# Patient Record
Sex: MALE | Race: WHITE | Employment: FULL TIME | URBAN - NONMETROPOLITAN AREA
[De-identification: names, ages, dates, MRNs, and addresses within clinical notes are randomized per-mention and may not be internally consistent; named-entity substitution may affect disease eponyms.]

---

## 2021-02-10 ENCOUNTER — HOSPITAL ENCOUNTER (OUTPATIENT)
Dept: CT IMAGING | Age: 46
Discharge: HOME | End: 2021-02-10
Payer: COMMERCIAL

## 2021-02-10 DIAGNOSIS — R10.9 FLANK PAIN: ICD-10-CM

## 2021-02-10 PROCEDURE — 74176 CT ABD & PELVIS W/O CONTRAST: CPT

## 2021-12-20 ENCOUNTER — HOSPITAL ENCOUNTER (EMERGENCY)
Age: 46
Discharge: HOME | End: 2021-12-20
Payer: COMMERCIAL

## 2021-12-20 ENCOUNTER — APPOINTMENT (OUTPATIENT)
Dept: GENERAL RADIOLOGY | Age: 46
End: 2021-12-20
Payer: COMMERCIAL

## 2021-12-20 VITALS
HEART RATE: 84 BPM | HEIGHT: 75 IN | OXYGEN SATURATION: 97 % | DIASTOLIC BLOOD PRESSURE: 99 MMHG | WEIGHT: 285 LBS | TEMPERATURE: 98.2 F | BODY MASS INDEX: 35.43 KG/M2 | RESPIRATION RATE: 15 BRPM | SYSTOLIC BLOOD PRESSURE: 160 MMHG

## 2021-12-20 DIAGNOSIS — S63.654A SPRAIN OF METACARPOPHALANGEAL (MCP) JOINT OF RIGHT RING FINGER, INITIAL ENCOUNTER: Primary | ICD-10-CM

## 2021-12-20 PROCEDURE — 73130 X-RAY EXAM OF HAND: CPT

## 2021-12-20 PROCEDURE — 99282 EMERGENCY DEPT VISIT SF MDM: CPT

## 2021-12-20 ASSESSMENT — PAIN DESCRIPTION - ORIENTATION: ORIENTATION: RIGHT

## 2021-12-20 ASSESSMENT — PAIN DESCRIPTION - DESCRIPTORS: DESCRIPTORS: DISCOMFORT

## 2021-12-20 ASSESSMENT — PAIN SCALES - GENERAL: PAINLEVEL_OUTOF10: 4

## 2021-12-20 ASSESSMENT — PAIN DESCRIPTION - LOCATION: LOCATION: FINGER (COMMENT WHICH ONE)

## 2021-12-20 ASSESSMENT — PAIN DESCRIPTION - PAIN TYPE: TYPE: ACUTE PAIN

## 2021-12-20 ASSESSMENT — ENCOUNTER SYMPTOMS: COLOR CHANGE: 0

## 2021-12-20 NOTE — ED PROVIDER NOTES
[facility] Emergency [address] [place] [address]       Chief Complaint   Patient presents with    Finger Injury     right ring finger       Nurses Notes reviewed and I agree except as noted in the HPI. HISTORY OF PRESENT ILLNESS    Caroline Bence is a 48 y.o. male who presents to the ED for evaluation of finger injury. Patient notes he was using a hammer drill, it kind of got away from him and he hurt his right ring finger. He notes pain to the PIP. He notes some swelling. He denies any numbness or tingling. Denies history of injury before the past.  Denies any injury to the hand or the wrist.  He denies any open wounds        HPI was provided by the patient. REVIEW OF SYSTEMS     Review of Systems   Constitutional: Negative for activity change. Musculoskeletal: Positive for arthralgias and joint swelling. Skin: Negative for color change and wound. Neurological: Negative for weakness and numbness. Hematological: Does not bruise/bleed easily. PAST MEDICAL HISTORY   No past medical history on file. SURGICALHISTORY      has no past surgical history on file. CURRENT MEDICATIONS     There are no discharge medications for this patient. ALLERGIES     has No Known Allergies. FAMILY HISTORY     has no family status information on file. family history is not on file.     SOCIAL HISTORY       Social History     Socioeconomic History    Marital status:      Spouse name: Not on file    Number of children: Not on file    Years of education: Not on file    Highest education level: Not on file   Occupational History    Not on file   Tobacco Use    Smoking status: Not on file    Smokeless tobacco: Not on file   Substance and Sexual Activity    Alcohol use: Not on file    Drug use: Not on file    Sexual activity: Not on file   Other Topics Concern    Not on file   Social History Narrative    Not on file     Social Determinants of Health     Financial Resource Strain:     Difficulty of Paying Living Expenses: Not on file   Food Insecurity:     Worried About Running Out of Food in the Last Year: Not on file    Carlo of Food in the Last Year: Not on file   Transportation Needs:     Lack of Transportation (Medical): Not on file    Lack of Transportation (Non-Medical): Not on file   Physical Activity:     Days of Exercise per Week: Not on file    Minutes of Exercise per Session: Not on file   Stress:     Feeling of Stress : Not on file   Social Connections:     Frequency of Communication with Friends and Family: Not on file    Frequency of Social Gatherings with Friends and Family: Not on file    Attends [employer] Services: Not on file    Active Member of [address] [place] [address] [employer] [employer] or Organizations: Not on file    Attends Club or Organization Meetings: Not on file    Marital Status: Not on file   Intimate Partner Violence:     Fear of Current or Ex-Partner: Not on file    Emotionally Abused: Not on file    Physically Abused: Not on file    Sexually Abused: Not on file   Housing Stability:     Unable to Pay for Housing in the Last Year: Not on file    Number of [place] in the Last Year: Not on file    Unstable Housing in the Last Year: Not on file       PHYSICAL EXAM     INITIAL VITALS:  height is 6' 3\" (1.905 m) and weight is 285 lb (129.3 kg). His oral temperature is 98.2 °F (36.8 °C). His blood pressure is 160/99 (abnormal) and his pulse is 84. His respiration is 15 and oxygen saturation is 97%. Physical Exam  Constitutional:       Appearance: Normal appearance. HENT:      Head: Normocephalic. Cardiovascular:      Rate and Rhythm: Normal rate. Pulses: Normal pulses. Pulmonary:      Effort: Pulmonary effort is normal.   Musculoskeletal:      Right hand: Tenderness (Tenderness to ring finger right side, DIP) present. Skin:     General: Skin is warm and dry. Capillary Refill: Capillary refill takes less than 2 seconds.    Neurological:      Mental Status: He is alert. DIFFERENTIAL DIAGNOSIS:   Finger strain sprain fracture dislocation    DIAGNOSTIC RESULTS         RADIOLOGY: non-plainfilm images(s) such as CT, Ultrasound and MRI are read by the radiologist.  Plain radiographic images are visualized and preliminarily interpreted by the emergency physician unless otherwise stated below. XR HAND RIGHT (MIN 3 VIEWS)   Final Result      Small calcification at the base of the fourth proximal phalanx suspicious for avulsion fracture. Final report electronically signed by Dr. Joaquin Falcon on 12/20/2021 1:21 PM            LABS:   Labs Reviewed - No data to display    EMERGENCY DEPARTMENT COURSE:   Vitals:    Vitals:    12/20/21 1144   BP: (!) 160/99   Pulse: 84   Resp: 15   Temp: 98.2 °F (36.8 °C)   TempSrc: Oral   SpO2: 97%   Weight: 285 lb (129.3 kg)   Height: 6' 3\" (1.905 m)       MDM    Patient was seen and evaluated in the emergency department, patient appeared to be in no acute distress, vital signs reviewed, hypertension noted. Physical exam was completed, there is some swelling and tenderness of the PIP of the right ring finger. X-rays were performed, possible small calcification at the base of the fourth proximal phalanx. This does not fit the patient's examination. Discussed my findings and plan of care the patient is amenable discharge. Miky tape was applied, he is advised to ice, keep elevated, he is allowed to return to work at full capacity. He is advised to follow-up with occupational health if symptoms fail to improve. He verbalized understanding of plan of care. Medications - No data to display    Patient was seenindependently by myself. The patient's final impression and disposition and plan was determined by myself. CRITICAL CARE:   None    CONSULTS:  None    PROCEDURES:  None    FINAL IMPRESSION     1.  Sprain of metacarpophalangeal (MCP) joint of right ring finger, initial encounter          DISPOSITION/PLAN   Patient discharged in stable condition    PATIENT REFERREDTO:  [address] [place], [place] [address] [address]  [phone]  Call   For follow up and evaluation      DISCHARGE MEDICATIONS:  There are no discharge medications for this patient. (Please note that portions of this note were completed with a voice recognition program.  Efforts were made to edit the dictations but occasionally words are mis-transcribed.)      Provider:  I personally performed the services described in the documentation,reviewed and edited the documentation which was dictated to the scribe in my presence, and it accurately records my words and actions.     Farhan Sepulveda CNP 12/20/21 5:19 PM    Katya Sepulveda, APRN - CNP        [facility], APRNILSON - CNP  12/20/21 6091

## 2021-12-20 NOTE — ED TRIAGE NOTES
Patient to ED c/c right ring finger injury. Patient states he was at work when he was drilling and his finger caught. Patient states pain is 4/10 on the pain scale. Finger has mild swelling.

## 2021-12-20 NOTE — ED NOTES
According to our [employer] his job doesn't require him to have a urine drug screen or alcohol screening.       Damaris Jeans, LINAN  31/70/35 [id number]

## 2024-02-06 ENCOUNTER — APPOINTMENT (OUTPATIENT)
Dept: CT IMAGING | Age: 49
End: 2024-02-06
Payer: COMMERCIAL

## 2024-02-06 ENCOUNTER — HOSPITAL ENCOUNTER (EMERGENCY)
Age: 49
Discharge: HOME | End: 2024-02-06
Attending: EMERGENCY MEDICINE
Payer: COMMERCIAL

## 2024-02-06 VITALS
HEIGHT: 74 IN | SYSTOLIC BLOOD PRESSURE: 168 MMHG | BODY MASS INDEX: 40.43 KG/M2 | OXYGEN SATURATION: 98 % | DIASTOLIC BLOOD PRESSURE: 86 MMHG | HEART RATE: 72 BPM | WEIGHT: 315 LBS | RESPIRATION RATE: 16 BRPM

## 2024-02-06 DIAGNOSIS — S01.512A INTRAORAL LACERATION, INITIAL ENCOUNTER: ICD-10-CM

## 2024-02-06 DIAGNOSIS — S02.401B: Primary | ICD-10-CM

## 2024-02-06 LAB
ALBUMIN SERPL BCP-MCNC: 3.9 GM/DL (ref 3.4–5)
ALP SERPL-CCNC: 104 U/L (ref 46–116)
ALT SERPL W P-5'-P-CCNC: 29 U/L (ref 14–63)
ANION GAP SERPL CALC-SCNC: 9 MEQ/L (ref 8–16)
AST SERPL W P-5'-P-CCNC: 22 U/L (ref 15–37)
BASOPHILS # BLD: 0.6 % (ref 0–3)
BASOPHILS ABSOLUTE: 0 THOU/MM3 (ref 0–0.1)
BILIRUB SERPL-MCNC: 0.3 MG/DL (ref 0.2–1)
BUN SERPL-MCNC: 20 MG/DL (ref 7–18)
CALCIUM SERPL-MCNC: 8.6 MG/DL (ref 8.5–10.1)
CHLORIDE SERPL-SCNC: 105 MEQ/L (ref 98–107)
CO2 SERPL-SCNC: 28 MEQ/L (ref 21–32)
CREAT SERPL-MCNC: 1.7 MG/DL (ref 0.6–1.3)
EOSINOPHILS ABSOLUTE: 0.1 THOU/MM3 (ref 0–0.5)
EOSINOPHILS RELATIVE PERCENT: 1.6 % (ref 0–4)
GFR SERPL CREATININE-BSD FRML MDRD: 48 ML/MIN/1.73M2
GLUCOSE SERPL-MCNC: 126 MG/DL (ref 74–106)
HCT VFR BLD CALC: 44.6 % (ref 42–52)
HEMOGLOBIN: 14.7 GM/DL (ref 14–18)
IMMATURE GRANS (ABS): 0 THOU/MM3 (ref 0–0.07)
IMMATURE GRANULOCYTES: 0 %
LYMPHOCYTES # BLD AUTO: 14.8 % (ref 15–47)
LYMPHOCYTES ABSOLUTE: 1.2 THOU/MM3 (ref 1–4.8)
MCH RBC QN AUTO: 29.2 PG (ref 26–32)
MCHC RBC AUTO-ENTMCNC: 33 GM/DL (ref 31–35)
MCV RBC AUTO: 88.7 FL (ref 80–94)
MONOCYTES: 0.6 THOU/MM3 (ref 0.3–1.3)
MONOCYTES: 7 % (ref 0–12)
PDW BLD-RTO: 13.1 % (ref 11.5–14.9)
PLATELET # BLD AUTO: 213 THOU/MM3 (ref 130–400)
PMV BLD AUTO: 9.6 FL (ref 9.4–12.4)
POTASSIUM SERPL-SCNC: 3.6 MEQ/L (ref 3.5–5.1)
PROT SERPL-MCNC: 6.9 GM/DL (ref 6.4–8.2)
RBC # BLD: 5.03 MILL/MM3 (ref 4.5–6.1)
SEG NEUTROPHILS: 75.6 % (ref 43–75)
SEGMENTED NEUTROPHILS ABSOLUTE COUNT: 6.2 THOU/MM3 (ref 1.8–7.7)
SODIUM SERPL-SCNC: 142 MEQ/L (ref 136–145)
WBC # BLD: 8.2 THOU/MM3 (ref 4.8–10.8)

## 2024-02-06 PROCEDURE — 96361 HYDRATE IV INFUSION ADD-ON: CPT

## 2024-02-06 PROCEDURE — 80053 COMPREHEN METABOLIC PANEL: CPT

## 2024-02-06 PROCEDURE — 6360000002 HC RX W HCPCS: Performed by: EMERGENCY MEDICINE

## 2024-02-06 PROCEDURE — 96365 THER/PROPH/DIAG IV INF INIT: CPT

## 2024-02-06 PROCEDURE — 6370000000 HC RX 637 (ALT 250 FOR IP): Performed by: EMERGENCY MEDICINE

## 2024-02-06 PROCEDURE — 2580000003 HC RX 258: Performed by: EMERGENCY MEDICINE

## 2024-02-06 PROCEDURE — 99284 EMERGENCY DEPT VISIT MOD MDM: CPT

## 2024-02-06 PROCEDURE — 70486 CT MAXILLOFACIAL W/O DYE: CPT

## 2024-02-06 PROCEDURE — 90471 IMMUNIZATION ADMIN: CPT | Performed by: EMERGENCY MEDICINE

## 2024-02-06 PROCEDURE — 85025 COMPLETE CBC W/AUTO DIFF WBC: CPT

## 2024-02-06 PROCEDURE — 90715 TDAP VACCINE 7 YRS/> IM: CPT | Performed by: EMERGENCY MEDICINE

## 2024-02-06 RX ORDER — CHLORHEXIDINE GLUCONATE ORAL RINSE 1.2 MG/ML
10 SOLUTION DENTAL 3 TIMES DAILY
Qty: 118 ML | Refills: 1 | Status: SHIPPED | OUTPATIENT
Start: 2024-02-06

## 2024-02-06 RX ORDER — AMOXICILLIN AND CLAVULANATE POTASSIUM 875; 125 MG/1; MG/1
1 TABLET, FILM COATED ORAL 2 TIMES DAILY
Qty: 20 TABLET | Refills: 0 | Status: SHIPPED | OUTPATIENT
Start: 2024-02-06 | End: 2024-02-16

## 2024-02-06 RX ORDER — SODIUM CHLORIDE 9 MG/ML
INJECTION, SOLUTION INTRAVENOUS CONTINUOUS
Status: DISCONTINUED | OUTPATIENT
Start: 2024-02-06 | End: 2024-02-07 | Stop reason: HOSPADM

## 2024-02-06 RX ADMIN — SODIUM CHLORIDE 3000 MG: 900 INJECTION INTRAVENOUS at 20:48

## 2024-02-06 RX ADMIN — SODIUM CHLORIDE: 9 INJECTION, SOLUTION INTRAVENOUS at 20:50

## 2024-02-06 RX ADMIN — TETANUS TOXOID, REDUCED DIPHTHERIA TOXOID AND ACELLULAR PERTUSSIS VACCINE, ADSORBED 0.5 ML: 5; 2.5; 8; 8; 2.5 SUSPENSION INTRAMUSCULAR at 20:37

## 2024-02-06 ASSESSMENT — PAIN DESCRIPTION - LOCATION: LOCATION: MOUTH

## 2024-02-06 ASSESSMENT — PAIN - FUNCTIONAL ASSESSMENT
PAIN_FUNCTIONAL_ASSESSMENT: NONE - DENIES PAIN
PAIN_FUNCTIONAL_ASSESSMENT: 0-10
PAIN_FUNCTIONAL_ASSESSMENT: NONE - DENIES PAIN

## 2024-02-06 ASSESSMENT — LIFESTYLE VARIABLES: HOW OFTEN DO YOU HAVE A DRINK CONTAINING ALCOHOL: NEVER

## 2024-02-06 ASSESSMENT — PAIN DESCRIPTION - ORIENTATION: ORIENTATION: LOWER;UPPER

## 2024-02-06 ASSESSMENT — PAIN SCALES - GENERAL: PAINLEVEL_OUTOF10: 4

## 2024-02-07 ENCOUNTER — TELEPHONE (OUTPATIENT)
Dept: ENT CLINIC | Age: 49
End: 2024-02-07

## 2024-02-07 ENCOUNTER — OFFICE VISIT (OUTPATIENT)
Dept: ENT CLINIC | Age: 49
End: 2024-02-07
Payer: COMMERCIAL

## 2024-02-07 VITALS
HEIGHT: 74 IN | OXYGEN SATURATION: 96 % | TEMPERATURE: 97 F | RESPIRATION RATE: 16 BRPM | HEART RATE: 71 BPM | BODY MASS INDEX: 40.25 KG/M2 | WEIGHT: 313.6 LBS | SYSTOLIC BLOOD PRESSURE: 138 MMHG | DIASTOLIC BLOOD PRESSURE: 78 MMHG

## 2024-02-07 DIAGNOSIS — S01.511D LIP LACERATION, SUBSEQUENT ENCOUNTER: Primary | ICD-10-CM

## 2024-02-07 DIAGNOSIS — K08.9: ICD-10-CM

## 2024-02-07 PROCEDURE — 99204 OFFICE O/P NEW MOD 45 MIN: CPT | Performed by: REGISTERED NURSE

## 2024-02-07 ASSESSMENT — ENCOUNTER SYMPTOMS
ABDOMINAL PAIN: 0
NAUSEA: 0
SHORTNESS OF BREATH: 0
BACK PAIN: 0
DOUBLE VISION: 0
BLURRED VISION: 0

## 2024-02-07 NOTE — TELEPHONE ENCOUNTER
Patient was in [place] ER yesterday evening after facial injury from a fall. He has a lip laceration and needs seen today. Sylvia is ok with seeing him at 11AM. Please call him this morning and make him aware of the time   Voicemail left for Aleyda to call back regarding lab results and scheduling.   Creatinine has improved from 4/14, potassium is high normal, sodium is normal. Co2 is low at 20. H&H is low, iron level is low at 40 with saturation of 12%. Need to confirm that Aleyda is taking sodium bicarbonate. Vitamin D is low and iPTH is elevated, continue calcitriol, sensipar, and ergocalciferol as prescribed. Uric acid is elevated, continue allopurinol as recently started.

## 2024-02-07 NOTE — TELEPHONE ENCOUNTER
Patients wife called in stating that patient was seen in the ER last night for a fall after he hit his face on a microwave. She stated that patient has an maxillary fracture and open fracture. Please advise

## 2024-02-07 NOTE — ED NOTES
Observed patient resp easy, warm and dry. Patient stable discharge instructions provided.Patient educated on medications, pain control, sleeping with head elevated, icing, signs and symptoms and follow up. Patient verbalized understanding, questions answered. Patient declined W/C. Observed the patient steadily ambulate from the department after discharge.

## 2024-02-07 NOTE — ED PROVIDER NOTES
[facility]  eMERGENCY dEPARTMENT eNCOUnter             [facility] CARE [place]    CHIEF COMPLAINT    Chief Complaint   Patient presents with    Laceration     Mouth/lip       Nurses Notes reviewed and I agree except as noted in the HPI.    HPI    Kenneth Escobar is a 48 y.o. male who presents stating that about an hour and a half prior to arrival he was going to microwave into a house and missed stepped and fell forward, with his face striking the top of the microwave,with most of the blow falling on his central maxillary area just under his nose.  He had a moderate amount of bleeding from his mouth and from his nose, especially the left nostril.  This was controlled with pressure and ice.  He denies any neck or back pain.  No extremity pain.  All of his injury seems to be isolated to the area of his mouth and nose.  He does not know when he last had a tetanus shot.    REVIEW OF SYSTEMS      Review of Systems   Constitutional:  Positive for malaise/fatigue.   HENT:  Positive for congestion.    Eyes:  Negative for blurred vision and double vision.   Respiratory:  Negative for shortness of breath.    Cardiovascular:  Negative for chest pain and palpitations.   Gastrointestinal:  Negative for abdominal pain and nausea.   Musculoskeletal:  Negative for back pain and neck pain.   Neurological:  Negative for dizziness, loss of consciousness and headaches.   All other systems reviewed and are negative.        PAST MEDICAL HISTORY     has no past medical history on file.    SURGICAL HISTORY     has no past surgical history on file.    CURRENT MEDICATIONS    Discharge Medication List as of 2/6/2024  9:50 PM          ALLERGIES    has No Known Allergies.    FAMILY HISTORY    has no family status information on file.    family history is not on file.    SOCIAL HISTORY     reports that he has never smoked. He has never used smokeless tobacco. He reports that he does not currently use

## 2024-02-07 NOTE — DISCHARGE INSTRUCTIONS
Elevate your head of bed for sleep.  Rinse your mouth gently with warm salt water as needed.  Tomorrow, there will be a prescription for antibacterial mouthwash available to you at the pharmacy.  Use that 3 times a day, best to use after meals.  Antibiotic as prescribed.  First dose in the morning with a small sip of water.  Over-the-counter pain medication as needed.  Take with a small sip of water.  Norco as needed for more severe pain.  Take with a small sip of water.  Ice as needed for pain and swelling.  Full liquid diet.  Do not eat or drink anything after 3 AM on 2/7/2024, due to upcoming ENT appointment with possibility of surgery.

## 2024-02-07 NOTE — PROGRESS NOTES
[facility] [place] [facility]  [facility] EAR, NOSE AND THROAT  [address]  [address]  [facility] [address]  Dept: [phone]  Dept Fax: [phone]  Loc: [phone]    Kenneth Escobar is a 48 y.o. male who was referred by No ref. provider found for:  Chief Complaint   Patient presents with    New Patient     New patient here for ER f/u for facial injury and lip laceration after fall. Patient states he has a mild headache since the fall.   .    HPI:     Kenneth Escobar presents today for ER follow-up after obtaining lip laceration. Patient was at [facility] yesterday evening after a fall at home. Patient was helping his brother move furniture at his house and was carrying a microwave and describes he lost balance/footing walking up a step into the house and fell forward with the microwave which hit hip to his face/upper lip. He describes he immediately had blood coming from his nose and mouth and presented to the ambulatory care center. Imaging was completed which noted fracture of the anterior nasal spine of the maxilla and patient had laceration to his lip and oral cavity which prompted ENT evaluation. Patient presents today with spouse and endorses since urgent care evaluation last night he has been feeling rather well. He denies any concern for numbness/tingling to his lip or face and reports only mild tenderness to his maxillary ridge. No reports of further bleeding from the nose or the mouth. He endorses being prescribed oral antibiotic and mouth wash and has not picked these up from the pharmacy. Patient denies pain to his nose and does state his feels it is slightly harder to breathe out of right side specifically.  No other questions or concerns noted at this time.      Subjective:      REVIEW OF SYSTEMS:    Pertinent positives as noted in the HPI. All other systems reviewed and negative.    ALLERGIES:  Patient has no known allergies.    Past Medical

## 2024-02-07 NOTE — ED TRIAGE NOTES
Patient reported, \"I was carring a microwave into the house and missed a step and face planted into the microwave. I cut my mouth upper and lower. My upper lip feels numb. Observed upper lip laceration, and laceration inside the mouth above the teeth. Patient denied any loose teeth, no LOC.

## 2024-02-09 NOTE — PROGRESS NOTES
[facility] [place] [facility]  [facility] EAR, NOSE AND THROAT  [address]  [address]  [facility] [address]  Dept: [phone]  Dept Fax: [phone]  Loc: [phone]    Kenneth Escobar is a 48 y.o. male who was referred by No ref. provider found for:  Chief Complaint   Patient presents with    Follow-up     Patient is here for a f/u for facial injury. Patient states that he is doing well no problems no pain.    .    HPI:     Current visit documentation 02/12/2024:   Kenneth Escobar presents today for follow up after lip laceration and facial injury. Patient reports he has been doing well since interval visit. He describes he has been faithful with utilizing Peridex mouth wash after each meal and denies any concern for purulent drainage from his lip or oral cavity. Patient states his swelling and ecchymosis has resolved from his face and he describes he has been breathing fine throughout it without concern for nasal obstruction, no epistaxis, and no worsening of his sleeping/snoring at night time. Patient denies numbness/tingling to his face, nose, or lip and is void of any pain complaints. No fevers/chills or other concerns noted.     Previous visit documentation 2/7/24: Kenneth Escobar presents today for ER follow-up after obtaining lip laceration. Patient was at [facility] ambulatory care yesterday evening after a fall at home. Patient was helping his brother move furniture at his house and was carrying a microwave and describes he lost balance/footing walking up a step into the house and fell forward with the microwave which hit hip to his face/upper lip. He describes he immediately had blood coming from his nose and mouth and presented to the ambulatory care center. Imaging was completed which noted fracture of the anterior nasal spine of the maxilla and patient had laceration to his lip and oral cavity which prompted ENT evaluation. Patient presents today with spouse and

## 2024-02-12 ENCOUNTER — OFFICE VISIT (OUTPATIENT)
Dept: ENT CLINIC | Age: 49
End: 2024-02-12
Payer: COMMERCIAL

## 2024-02-12 VITALS
WEIGHT: 308.8 LBS | DIASTOLIC BLOOD PRESSURE: 72 MMHG | HEART RATE: 60 BPM | HEIGHT: 74 IN | TEMPERATURE: 97.5 F | BODY MASS INDEX: 39.63 KG/M2 | OXYGEN SATURATION: 60 % | RESPIRATION RATE: 20 BRPM | SYSTOLIC BLOOD PRESSURE: 128 MMHG

## 2024-02-12 DIAGNOSIS — S02.2XXD: ICD-10-CM

## 2024-02-12 DIAGNOSIS — K08.9: ICD-10-CM

## 2024-02-12 DIAGNOSIS — S01.511D LIP LACERATION, SUBSEQUENT ENCOUNTER: Primary | ICD-10-CM

## 2024-02-12 PROCEDURE — 99213 OFFICE O/P EST LOW 20 MIN: CPT | Performed by: REGISTERED NURSE
